# Patient Record
Sex: FEMALE | Race: WHITE
[De-identification: names, ages, dates, MRNs, and addresses within clinical notes are randomized per-mention and may not be internally consistent; named-entity substitution may affect disease eponyms.]

---

## 2019-04-15 ENCOUNTER — HOSPITAL ENCOUNTER (OUTPATIENT)
Dept: HOSPITAL 95 - ORSCMMR | Age: 44
Discharge: HOME | End: 2019-04-15
Attending: INTERNAL MEDICINE
Payer: COMMERCIAL

## 2019-04-15 VITALS — BODY MASS INDEX: 30.6 KG/M2 | HEIGHT: 64.02 IN | WEIGHT: 179.24 LBS

## 2019-04-15 DIAGNOSIS — F32.9: ICD-10-CM

## 2019-04-15 DIAGNOSIS — R10.13: Primary | ICD-10-CM

## 2019-04-15 DIAGNOSIS — Z79.899: ICD-10-CM

## 2019-04-15 DIAGNOSIS — F17.210: ICD-10-CM

## 2019-04-15 DIAGNOSIS — E03.9: ICD-10-CM

## 2019-04-15 PROCEDURE — 0DB48ZX EXCISION OF ESOPHAGOGASTRIC JUNCTION, VIA NATURAL OR ARTIFICIAL OPENING ENDOSCOPIC, DIAGNOSTIC: ICD-10-PCS | Performed by: INTERNAL MEDICINE

## 2019-04-15 PROCEDURE — 0DB58ZX EXCISION OF ESOPHAGUS, VIA NATURAL OR ARTIFICIAL OPENING ENDOSCOPIC, DIAGNOSTIC: ICD-10-PCS | Performed by: INTERNAL MEDICINE

## 2019-04-15 PROCEDURE — 0DB68ZX EXCISION OF STOMACH, VIA NATURAL OR ARTIFICIAL OPENING ENDOSCOPIC, DIAGNOSTIC: ICD-10-PCS | Performed by: INTERNAL MEDICINE

## 2019-04-15 PROCEDURE — 0DB98ZX EXCISION OF DUODENUM, VIA NATURAL OR ARTIFICIAL OPENING ENDOSCOPIC, DIAGNOSTIC: ICD-10-PCS | Performed by: INTERNAL MEDICINE

## 2019-04-15 NOTE — NUR
History, Chart, Medications and Allergies reviewed before start of
procedure. Patient confirms NPO status and agrees with scheduled surgery.
Patient States Post-Procedure ride home has been arranged with her ,
Sixto.

## 2019-04-15 NOTE — NUR
DAY SURGERY RN | DISCHARGE
 
VSS. A/O. No issues. Steady on feet. Discharge instructions given with family
present. Patient taken to front entrance for ride home.  is ride home.

## 2019-04-15 NOTE — NUR
04/15/19 0955 Sumerlin,David C
PATIENT DETERMINED TO BE ASA APPROPRIATE FOR PROPOFOL SEDATION PRIOR
TO START OF PROCEDURE BY DR. ARMENTA. 3-LEAD EKG REVIEWED WITH PHYSICIAN
PRIOR TO START OF PROCEDURE. PATIENT CONFIRMS NPO STATUS AND AGREES
WITH SCHEDULED PROCEDURE. History, Chart, Medications and Allergies
reviewed before start of procedure. MONITOR INTACT WITH CONTINUOUS
PULSE OXIMETRY AND INTERMITTENT BP. O2 VIA N/C INTACT THROUGHOUT
SEDATION/PROCEDURE, NC 4 L O2. Bite Block Placed. HURRICAINE SPRAY TO
OROPHARYX IMMEDIATELY PRESEDATION.

## 2019-04-29 ENCOUNTER — HOSPITAL ENCOUNTER (OUTPATIENT)
Dept: HOSPITAL 95 - ER | Age: 44
Setting detail: OBSERVATION
LOS: 1 days | Discharge: HOME | End: 2019-04-30
Attending: SURGERY | Admitting: SURGERY
Payer: COMMERCIAL

## 2019-04-29 VITALS — BODY MASS INDEX: 30.73 KG/M2 | WEIGHT: 180.01 LBS | HEIGHT: 64.02 IN

## 2019-04-29 DIAGNOSIS — F17.210: ICD-10-CM

## 2019-04-29 DIAGNOSIS — K80.10: Primary | ICD-10-CM

## 2019-04-29 DIAGNOSIS — Z88.5: ICD-10-CM

## 2019-04-29 DIAGNOSIS — K21.9: ICD-10-CM

## 2019-04-29 DIAGNOSIS — Z91.040: ICD-10-CM

## 2019-04-29 DIAGNOSIS — F31.9: ICD-10-CM

## 2019-04-29 DIAGNOSIS — E03.9: ICD-10-CM

## 2019-04-29 DIAGNOSIS — F43.12: ICD-10-CM

## 2019-04-29 DIAGNOSIS — Z79.899: ICD-10-CM

## 2019-04-29 LAB
ALBUMIN SERPL BCP-MCNC: 4.6 G/DL (ref 3.4–5)
ALBUMIN/GLOB SERPL: 1.2 {RATIO} (ref 0.8–1.8)
ALT SERPL W P-5'-P-CCNC: 25 U/L (ref 12–78)
ANION GAP SERPL CALCULATED.4IONS-SCNC: 7 MMOL/L (ref 6–16)
AST SERPL W P-5'-P-CCNC: 12 U/L (ref 12–37)
BASOPHILS # BLD AUTO: 0.06 K/MM3 (ref 0–0.23)
BASOPHILS NFR BLD AUTO: 1 % (ref 0–2)
BILIRUB SERPL-MCNC: 0.2 MG/DL (ref 0.1–1)
BUN SERPL-MCNC: 15 MG/DL (ref 8–24)
CALCIUM SERPL-MCNC: 10.2 MG/DL (ref 8.5–10.1)
CHLORIDE SERPL-SCNC: 108 MMOL/L (ref 98–108)
CO2 SERPL-SCNC: 22 MMOL/L (ref 21–32)
CREAT SERPL-MCNC: 0.59 MG/DL (ref 0.4–1)
DEPRECATED RDW RBC AUTO: 43.6 FL (ref 35.1–46.3)
EOSINOPHIL # BLD AUTO: 0.22 K/MM3 (ref 0–0.68)
EOSINOPHIL NFR BLD AUTO: 2 % (ref 0–6)
ERYTHROCYTE [DISTWIDTH] IN BLOOD BY AUTOMATED COUNT: 13.7 % (ref 11.7–14.2)
GLOBULIN SER CALC-MCNC: 3.7 G/DL (ref 2.2–4)
GLUCOSE SERPL-MCNC: 86 MG/DL (ref 70–99)
HCT VFR BLD AUTO: 42.5 % (ref 33–51)
HGB BLD-MCNC: 13.9 G/DL (ref 11.5–16)
IMM GRANULOCYTES # BLD AUTO: 0.02 K/MM3 (ref 0–0.1)
IMM GRANULOCYTES NFR BLD AUTO: 0 % (ref 0–1)
KETONES UR STRIP-MCNC: (no result) MG/DL
LEUKOCYTE ESTERASE UR QL STRIP: (no result)
LYMPHOCYTES # BLD AUTO: 3.54 K/MM3 (ref 0.84–5.2)
LYMPHOCYTES NFR BLD AUTO: 33 % (ref 21–46)
MCHC RBC AUTO-ENTMCNC: 32.7 G/DL (ref 31.5–36.5)
MCV RBC AUTO: 86 FL (ref 80–100)
MONOCYTES # BLD AUTO: 0.45 K/MM3 (ref 0.16–1.47)
MONOCYTES NFR BLD AUTO: 4 % (ref 4–13)
NEUTROPHILS # BLD AUTO: 6.37 K/MM3 (ref 1.96–9.15)
NEUTROPHILS NFR BLD AUTO: 60 % (ref 41–73)
NRBC # BLD AUTO: 0 K/MM3 (ref 0–0.02)
NRBC BLD AUTO-RTO: 0 /100 WBC (ref 0–0.2)
PLATELET # BLD AUTO: 346 K/MM3 (ref 150–400)
POTASSIUM SERPL-SCNC: 3.6 MMOL/L (ref 3.5–5.5)
PROT SERPL-MCNC: 8.3 G/DL (ref 6.4–8.2)
PROT UR STRIP-MCNC: (no result) MG/DL
RBC #/AREA URNS HPF: (no result) /HPF (ref 0–2)
SODIUM SERPL-SCNC: 137 MMOL/L (ref 136–145)
SP GR SPEC: 1.03 (ref 1–1.02)
UROBILINOGEN UR STRIP-MCNC: (no result) MG/DL
WBC #/AREA URNS HPF: (no result) /HPF (ref 0–5)

## 2019-04-29 PROCEDURE — C1729 CATH, DRAINAGE: HCPCS

## 2019-04-30 PROCEDURE — BF13YZZ FLUOROSCOPY OF GALLBLADDER AND BILE DUCTS USING OTHER CONTRAST: ICD-10-PCS | Performed by: SURGERY

## 2019-04-30 PROCEDURE — 0FT44ZZ RESECTION OF GALLBLADDER, PERCUTANEOUS ENDOSCOPIC APPROACH: ICD-10-PCS | Performed by: SURGERY

## 2019-04-30 NOTE — NUR
pt arrived back to room from pacu
A&OX4.  REPORTS PAIN 1/10 AT THIS TIME.  DENIES N/V.  PROVIDED WATER AND ICE
CHIPS.  LAP INCISIONS TO ABD X4 W/STERI STRIPS CDI.  VSS.  PT VERBALIZED
DESIRE TO GO HOME AFTER RECOVERY PERIOD.

## 2019-04-30 NOTE — NUR
PT DISCHARGED TO HOME WITH SPOUSE IN PERSONAL VEHICLE. D/C INSTRUCTIONS GIVEN,
WORK NOTE PRESCRIPTION INCLUDED. PT'S PAIN MEDICATIONS ALREADY PICKED UP FROM
THE PHARMACY PER SPOUSE. PT ENCOURAGED TO CALL WITH ANY QUESTIONS, REMINDED OF
FOLLOW UP CALL AND APPOINTMENT. IV REMOVED.

## 2019-04-30 NOTE — NUR
NEW ADMIT FOR CHOLECYSTITIS. PT HAS DONE WELL SINCE ARRIVAL TO UNIT. HAS BEEN
MEDICATED ONCE FOR PAIN. DENIES N/V. SURGICAL PACKET ON CHART. PT NPO. NEW 18G
PLACED IN LEFT HAND. INDEPENDENT IN ROOM. CALL LIGHT IN REACH. PLAN FOR OR
TODAY. WILL REPORT OFF TO NEXT SHIFT.

## 2019-04-30 NOTE — NUR
SUMMARY
PT VOIDED AND PASSED SMALL AMT FLATUS. TOLERATING CLEAR LIQUIDS.  REPORTS
TOLERABLE PAIN.  AWAITING SPOUSE'S RETURN TO AMBULATE IN [place].  GAVE
PRESCRIPTION TO SPOUSE TO FILL.  PT PLANNING TO DISCHARGE AFTER POST OP VS
COMPLETE.

## 2019-04-30 NOTE — NUR
History, Chart, Medications and Allergies reviewed before start of
procedure. Patient confirms NPO status and agrees with scheduled surgery.
Lungs clear T/O to Auscultation.
 
SKIN ON ABDOMEN CLEAR, WITHOUT REDNESS OR ISSUES. UPON APPLICATION OF
CHLORHEXIDINE PATIENT BEGAN FEELING VERY ITCHY AND THEN HER ABDOMEN STARTED TO
HAVE A BURNING SENSATION. COOL WET CLOTH PROVIDED AND PATIENT REPORTED GOOD
RELIEF AFTER WASHING AND DRYING HER ABDOMEN. NO SCRATCHES NOTED UPON
EXAMINATION AFTER WASHING, NO REDNESS OR RASH APPARENT.
 
REPORT TO AMAN HART RN.